# Patient Record
Sex: FEMALE | Race: BLACK OR AFRICAN AMERICAN | NOT HISPANIC OR LATINO | Employment: FULL TIME | URBAN - NONMETROPOLITAN AREA
[De-identification: names, ages, dates, MRNs, and addresses within clinical notes are randomized per-mention and may not be internally consistent; named-entity substitution may affect disease eponyms.]

---

## 2021-03-25 ENCOUNTER — OFFICE VISIT (OUTPATIENT)
Dept: CARDIOLOGY | Facility: CLINIC | Age: 56
End: 2021-03-25

## 2021-03-25 VITALS
BODY MASS INDEX: 31.07 KG/M2 | DIASTOLIC BLOOD PRESSURE: 78 MMHG | HEIGHT: 68 IN | HEART RATE: 78 BPM | WEIGHT: 205 LBS | SYSTOLIC BLOOD PRESSURE: 120 MMHG

## 2021-03-25 DIAGNOSIS — I10 ESSENTIAL HYPERTENSION: ICD-10-CM

## 2021-03-25 DIAGNOSIS — R06.09 DOE (DYSPNEA ON EXERTION): ICD-10-CM

## 2021-03-25 DIAGNOSIS — R00.2 PALPITATIONS: Primary | ICD-10-CM

## 2021-03-25 PROCEDURE — 99204 OFFICE O/P NEW MOD 45 MIN: CPT | Performed by: INTERNAL MEDICINE

## 2021-03-25 PROCEDURE — 93000 ELECTROCARDIOGRAM COMPLETE: CPT | Performed by: INTERNAL MEDICINE

## 2021-03-25 RX ORDER — CLONIDINE HYDROCHLORIDE 0.2 MG/1
TABLET ORAL
COMMUNITY
Start: 2021-02-15 | End: 2023-02-21

## 2021-03-25 RX ORDER — METOPROLOL SUCCINATE 50 MG/1
TABLET, EXTENDED RELEASE ORAL
COMMUNITY
Start: 2021-03-01 | End: 2022-11-28

## 2021-03-25 RX ORDER — LISINOPRIL 20 MG/1
20 TABLET ORAL 2 TIMES DAILY
COMMUNITY
Start: 2021-03-09 | End: 2022-11-28

## 2021-03-25 RX ORDER — HYDRALAZINE HYDROCHLORIDE 50 MG/1
50 TABLET, FILM COATED ORAL 3 TIMES DAILY
COMMUNITY
Start: 2021-02-23 | End: 2022-11-28

## 2021-03-25 RX ORDER — HYDROCHLOROTHIAZIDE 25 MG/1
25 TABLET ORAL DAILY
COMMUNITY
Start: 2021-03-16 | End: 2022-11-28

## 2021-03-25 RX ORDER — AMLODIPINE BESYLATE 5 MG/1
10 TABLET ORAL DAILY
COMMUNITY
Start: 2021-02-23 | End: 2022-11-28

## 2021-03-26 NOTE — PROGRESS NOTES
"    [facility] - CARDIOLOGY  New Patient Initial Outpatient Evaluation    Primary Care Physician: Domitila Sanabria APRN    Subjective     Chief Complaint:  Irregular heartbeat, high blood pressure    History of Present Illness    Ms. Banda is a 56-year-old female kindly referred to me for evaluation by Domitila Sanabria NP from [place], [place], for evaluation of palpitations. She also has a history of hypertension, mild valvular heart disease with records indicating mild aortic insufficiency and mild mitral regurgitation.    The patient reports palpitations that began approximately 8 to 9 months ago. She notes that she can occasionally feel her heartbeat and she can occasionally see her heartbeat in her chest. She describes the palpitations as episodes of pounding and racing that typically lasts a few minutes and resolve slowly. She does note occasional associated lightheadedness and shortness of breath and notes that she did not experience any palpitations during the time that she wore her last Holter monitor. She notes that these episodes occur once every 3 or 4 days and denies any associated syncope.    The patient also reports that her blood pressure has been elevated recently. She feels that her palpitations have worsened since she was placed on several blood pressure medications, but she cannot identify a specific one as the culprit. She notes an episode in which she was in [facility] on 01/22/2021 in which she felt \"weird\" so she presented at the emergency room at which time her blood pressure was determined to be 230/122 and her heart rate was 117 bpm. She notes that she has been to the emergency room 2 additional times for her elevated blood pressure. She reports that she was taking 4 blood pressure medications with her blood pressure still being elevated. She was prescribed HCTZ on  03/17/2021 after which her blood pressure decreased. She subsequently questions whether she still needs her hydralazine " since she started the HCTZ and notes that she is following with a nephrologist.    The patient also complains of exertional dyspnea that occurs after approximately 30 minutes of activity. She also admits to very little chest discomfort as well as fatigue. The fatigue is occasional and she describes it as a lack of energy that makes it difficult for her to get going.    The patient also complains of some indigestion over the last 3 weeks.    The patient reports that she has a significant family history of cardiac disease.      Review of Systems   Constitutional: Positive for malaise/fatigue.        Easy fatigability or decreasing exercise tolerance   Cardiovascular: Positive for dyspnea on exertion and palpitations. Negative for chest pain, claudication, leg swelling, near-syncope, orthopnea, paroxysmal nocturnal dyspnea and syncope.   Respiratory: Negative for shortness of breath.    Hematologic/Lymphatic: Does not bruise/bleed easily.        Otherwise complete ROS reviewed and negative except as mentioned in the HPI.      Past Medical History:   Past Medical History:   Diagnosis Date   • Arrhythmia    • Hypertension        Past Surgical History:History reviewed. No pertinent surgical history.    Family History: family history includes Heart attack in her father; Heart disease in her father.    Social History:  does not have a smoking history on file. She has never used smokeless tobacco. She reports that she does not drink alcohol and does not use drugs.    Medications:  Prior to Admission medications    Medication Sig Start Date End Date Taking? Authorizing Provider   amLODIPine (NORVASC) 5 MG tablet Take 10 mg by mouth Daily. 2/23/21  Yes Corrina Blake MD   cloNIDine (CATAPRES) 0.2 MG tablet TAKE 1 TABLET BY MOUTH EVERY 4 HOURS 2/15/21  Yes Corrina Blake MD   hydrALAZINE (APRESOLINE) 50 MG tablet Take 50 mg by mouth 3 (Three) Times a Day. 2/23/21  Yes Corrina Blake MD   hydroCHLOROthiazide  "(HYDRODIURIL) 25 MG tablet Take 25 mg by mouth Daily. 3/16/21  Yes Provider, MD Corrina   lisinopril (PRINIVIL,ZESTRIL) 20 MG tablet Take 20 mg by mouth 2 (Two) Times a Day. 3/9/21  Yes Provider, MD Corrina   metoprolol succinate XL (TOPROL-XL) 50 MG 24 hr tablet TAKE 1 & 1 2 (ONE & ONE HALF) TABLETS BY MOUTH TWICE DAILY 3/1/21  Yes ProviderCorrina MD   tapentadol (Nucynta) 100 MG tablet Nucynta 100 mg tablet   TAKE 1 TABLET BY MOUTH EVERY 8 HOURS AS NEEDED FOR PAIN*EXEMPT M25*   Yes Provider, MD Corrina     Allergies:  No Known Allergies    Objective     Vital Signs: /78   Pulse 78   Ht 172.7 cm (68\")   Wt 93 kg (205 lb)   BMI 31.17 kg/m²     Vitals and nursing note reviewed.   Constitutional:       General: Not in acute distress.     Appearance: Not in distress.   Neck:      Vascular: No JVD or JVR. JVD normal.   Pulmonary:      Effort: Pulmonary effort is normal.      Breath sounds: Normal breath sounds.   Cardiovascular:      Normal rate. Regular rhythm.      Murmurs: There is no murmur.      No gallop. No rub.   Pulses:     Intact distal pulses.   Edema:     Peripheral edema absent.   Skin:     General: Skin is warm and dry.   Neurological:      Mental Status: Alert, oriented to person, place, and time and oriented to person, place and time.         Results Reviewed:    -An echocardiogram report from 01/12/2021 was reviewed. This was performed at [facility] in [place], [place]. The report mentions and LV EF of 60-65% with mild concentric LVH, impaired LV relaxation, mildly dilated left atrium, mild mitral regurgitation, mild aortic insufficiency, and normal size and function of the right ventricle.    -A renal artery ultrasound duplex exam performed on 03/19/2021 was reviewed and reports no sonographic evidence of renal artery stenosis.      ECG 12 Lead    Date/Time: 3/25/2021 2:40 PM  Performed by: Galileo Wesley MD  Authorized by: Galileo Wesley MD   Comparison: compared " "with previous ECG from 1/13/2021  Similar to previous ECG  Rhythm: sinus rhythm  BPM: 78    Clinical impression: normal ECG              Assessment / Plan        Problem List Items Addressed This Visit        Cardiac and Vasculature    Palpitations - Primary    Relevant Orders    Holter Monitor - 72 Hour Up To 15 Days    Hypertension    Relevant Medications    metoprolol succinate XL (TOPROL-XL) 50 MG 24 hr tablet    lisinopril (PRINIVIL,ZESTRIL) 20 MG tablet    hydrALAZINE (APRESOLINE) 50 MG tablet    amLODIPine (NORVASC) 5 MG tablet    hydroCHLOROthiazide (HYDRODIURIL) 25 MG tablet    cloNIDine (CATAPRES) 0.2 MG tablet      Other Visit Diagnoses     HAMMOND (dyspnea on exertion)        Relevant Orders    Stress Test With Myocardial Perfusion (1 Day)        1. Palpitations: These seem relatively stable and her echocardiograms show a structurally normal heart, so they are very likely benign. However, she did not have symptoms while wearing the Holter last year.   -We will obtain the Holter monitor performed last year.    -We will place the patient in a 7-day Zio patch today in hopes of capturing an episode so as to provide symptom-rhythm correlation.    2. Exertional dyspnea: We will make sure this is not an ischemic equivalent by performing a Lexiscan nuclear perfusion stress test.     3. Essential hypertension: This is now well controlled after recent addition of a thiazide diuretic. Given this, it does not seem as though she has \"resistant hypertension\".  -She was asking to wean off hydralazine, but I asked that she discuss this with the prescribing physician.   -As blood pressure is well controlled currently, I would recommend continuing these therapies for now until she has discussed potential future changes with the physicians managing her blood pressure.     Follow-up to be determined on the basis of heart monitor and stress test results.     Scribed for Galileo Wesley MD by Leigh Hernandez.  03/25/21   19:32 " CDT    I Galileo Wesley MD have personally performed the services described in this document as scribed by the above individual, and it is both accurate and complete.   I have edited each component as needed.    Galileo Wesley MD  3/25/2021  23:11 CDT

## 2021-03-31 ENCOUNTER — TELEPHONE (OUTPATIENT)
Dept: CARDIOLOGY | Facility: CLINIC | Age: 56
End: 2021-03-31

## 2021-03-31 NOTE — TELEPHONE ENCOUNTER
Patient went back to work on 3/29 and had a couple of episodes of not feeling well. When she checked her blood pressure it was 180/101, then today it was 174/93. She was told her primary care provider would manage her blood pressure since it is well controlled, but she states her PCP will not write any more work excuses since she has seen you as her new cardiologist. She previously saw Dr Frank in [place], [place] who prescribed lisinopril and norvasc. Her kidney doctor prescribed hydralazine. She feels that her blood pressure is not well controlled even while on all of these medications. Please advise. Sharonda Douglas MA

## 2021-04-08 ENCOUNTER — HOSPITAL ENCOUNTER (OUTPATIENT)
Dept: CARDIOLOGY | Facility: HOSPITAL | Age: 56
Discharge: HOME | End: 2021-04-08

## 2021-04-08 VITALS
WEIGHT: 209 LBS | DIASTOLIC BLOOD PRESSURE: 83 MMHG | BODY MASS INDEX: 31.67 KG/M2 | HEIGHT: 68 IN | HEART RATE: 68 BPM | SYSTOLIC BLOOD PRESSURE: 160 MMHG

## 2021-04-08 DIAGNOSIS — R06.09 DOE (DYSPNEA ON EXERTION): ICD-10-CM

## 2021-04-08 LAB
BH CV REST NUCLEAR ISOTOPE DOSE: 10 MCI
BH CV STRESS BP STAGE 1: NORMAL
BH CV STRESS COMMENTS STAGE 1: NORMAL
BH CV STRESS DOSE REGADENOSON STAGE 1: 0.4
BH CV STRESS DURATION MIN STAGE 1: 0
BH CV STRESS DURATION SEC STAGE 1: 10
BH CV STRESS HR STAGE 1: 108
BH CV STRESS NUCLEAR ISOTOPE DOSE: 32 MCI
BH CV STRESS PROTOCOL 1: NORMAL
BH CV STRESS RECOVERY BP: NORMAL MMHG
BH CV STRESS RECOVERY HR: 99 BPM
BH CV STRESS STAGE 1: 1
MAXIMAL PREDICTED HEART RATE: 166 BPM
PERCENT MAX PREDICTED HR: 65.06 %
STRESS BASELINE BP: NORMAL MMHG
STRESS BASELINE HR: 68 BPM
STRESS PERCENT HR: 77 %
STRESS POST EXERCISE DUR SEC: 10 SEC
STRESS POST PEAK BP: NORMAL MMHG
STRESS POST PEAK HR: 108 BPM
STRESS TARGET HR: 141 BPM

## 2021-04-08 PROCEDURE — 0 TECHNETIUM SESTAMIBI: Performed by: INTERNAL MEDICINE

## 2021-04-08 PROCEDURE — 93017 CV STRESS TEST TRACING ONLY: CPT

## 2021-04-08 PROCEDURE — A9500 TC99M SESTAMIBI: HCPCS | Performed by: INTERNAL MEDICINE

## 2021-04-08 PROCEDURE — 93018 CV STRESS TEST I&R ONLY: CPT | Performed by: INTERNAL MEDICINE

## 2021-04-08 PROCEDURE — 25010000002 REGADENOSON 0.4 MG/5ML SOLUTION: Performed by: INTERNAL MEDICINE

## 2021-04-08 PROCEDURE — 78452 HT MUSCLE IMAGE SPECT MULT: CPT

## 2021-04-08 PROCEDURE — 78452 HT MUSCLE IMAGE SPECT MULT: CPT | Performed by: INTERNAL MEDICINE

## 2021-04-08 RX ADMIN — TECHNETIUM TC 99M SESTAMIBI 1 DOSE: 1 INJECTION INTRAVENOUS at 09:50

## 2021-04-08 RX ADMIN — REGADENOSON 0.4 MG: 0.08 INJECTION, SOLUTION INTRAVENOUS at 09:47

## 2021-04-08 RX ADMIN — TECHNETIUM TC 99M SESTAMIBI 1 DOSE: 1 INJECTION INTRAVENOUS at 08:33

## 2021-04-21 ENCOUNTER — TELEPHONE (OUTPATIENT)
Dept: CARDIOLOGY | Facility: CLINIC | Age: 56
End: 2021-04-21

## 2022-11-28 ENCOUNTER — OFFICE VISIT (OUTPATIENT)
Dept: OTOLARYNGOLOGY | Facility: CLINIC | Age: 57
End: 2022-11-28

## 2022-11-28 VITALS
WEIGHT: 223 LBS | HEART RATE: 81 BPM | DIASTOLIC BLOOD PRESSURE: 81 MMHG | TEMPERATURE: 97.3 F | SYSTOLIC BLOOD PRESSURE: 142 MMHG | BODY MASS INDEX: 33.8 KG/M2 | HEIGHT: 68 IN | RESPIRATION RATE: 16 BRPM

## 2022-11-28 DIAGNOSIS — R49.0 HOARSENESS: Primary | ICD-10-CM

## 2022-11-28 DIAGNOSIS — K21.9 LARYNGOPHARYNGEAL REFLUX (LPR): ICD-10-CM

## 2022-11-28 PROCEDURE — 99203 OFFICE O/P NEW LOW 30 MIN: CPT | Performed by: NURSE PRACTITIONER

## 2022-11-28 RX ORDER — ERGOCALCIFEROL (VITAMIN D2) 10 MCG
400 TABLET ORAL DAILY
COMMUNITY

## 2022-11-28 RX ORDER — BISOPROLOL FUMARATE 5 MG/1
TABLET, FILM COATED ORAL
COMMUNITY
Start: 2022-10-04

## 2022-11-28 RX ORDER — FELODIPINE 5 MG/1
TABLET, EXTENDED RELEASE ORAL
COMMUNITY
Start: 2022-11-08

## 2022-11-28 RX ORDER — MULTIPLE VITAMINS W/ MINERALS TAB 9MG-400MCG
1 TAB ORAL DAILY
COMMUNITY

## 2022-11-28 RX ORDER — PANTOPRAZOLE SODIUM 40 MG/1
40 TABLET, DELAYED RELEASE ORAL DAILY
Qty: 30 TABLET | Refills: 3 | Status: SHIPPED | OUTPATIENT
Start: 2022-11-28 | End: 2022-12-28

## 2022-11-28 RX ORDER — VALSARTAN AND HYDROCHLOROTHIAZIDE 320; 25 MG/1; MG/1
TABLET, FILM COATED ORAL
COMMUNITY
Start: 2022-10-04

## 2022-11-28 NOTE — PATIENT INSTRUCTIONS
Will obtain thyroid ultrasound per patient concern as well as abnormal TSH level  Reflux precautions  Take Protonix before breakfast    Gastroesophageal Reflux Disease (Laryngopharyngeal Reflux), Adult  Gastroesophageal reflux disease (GERD) and/or Laryngopharyngeal Reflux, (LPR) happens when acid from your stomach flows up into the esophagus and/or throat and voicebox or larynx. When acid comes in contact with the these organs, the acid can cause soreness (inflammation). Over time, GERD may create small holes (ulcers) in the lining of the esophagus and may lead to the development of hoarseness, difficulty swallowing,   feeling of something stuck in the throat, increased mucous or drainage and even predispose to the development of malignancies, (cancer).    CAUSES   Increased body weight. This puts pressure on the stomach, making acid rise from the stomach into the esophagus.  Smoking. This increases acid production in the stomach.  Drinking alcohol. This causes decreased pressure in the lower esophageal sphincter (valve or ring of muscle between the esophagus and stomach), allowing acid from the stomach into the esophagus.  Late evening meals and a full stomach. This increases pressure and acid production in the stomach.  A malformed lower esophageal sphincter  Diet which can include avoidance of gluten and dairy products  Age  SYMPTOMS   Burning pain in the lower part of the mid-chest behind the breastbone and in the mid-stomach area. This may occur twice a week or more often.  Trouble swallowing.  Sore throat.  Dry cough.  Asthma-like symptoms including chest tightness, shortness of breath, or wheezing.  Globus sensation-something stuck in the throat/fullness  Hoarseness  DIAGNOSIS   Your caregiver may be able to diagnose GERD based on your symptoms. In some cases, X-rays and other tests may be done to check for complications or to check the condition of your stomach and esophagus.  You may need to see another  doctor.  TREATMENT   Over-the-counter or prescription medicines to help decrease acid production.   Dietary and behavioral modifications or changes may be also recommended.  HOME CARE INSTRUCTIONS   Change the factors that you can control. Ask your caregiver for guidance concerning weight loss, quitting smoking, and alcohol consumption.  Avoid foods and drinks that make your symptoms worse, and MAY include such as:  Caffeine or alcoholic drinks.  Chocolate.  Gluten containing foods  Dairy  Peppermint or mint flavorings.  Garlic and onions.  Spicy foods.  Citrus fruits, such as oranges, cedrick, or limes.  Tomato-based foods such as sauce, chili, salsa, and pizza.  Fried and fatty foods.  Avoid lying down for the 3 hours prior to your bedtime or prior to taking a nap.  Eat small, frequent meals instead of large meals.  Wear loose-fitting clothing. Do not wear anything tight around your waist that causes pressure on your stomach.  Raise the head of your bed 6 to 8 inches with wood blocks to help you sleep. Extra pillows will not help.  Only take over-the-counter or prescription medicines for pain, discomfort, or fever as directed by your caregiver.  Do not take aspirin, ibuprofen, or other nonsteroidal anti-inflammatory drugs if possible (NSAIDs).  SEEK IMMEDIATE MEDICAL CARE IF:   You have pain in your arms, neck, jaw, teeth, or back.  Your pain increases or changes in intensity or duration.  You develop nausea, vomiting, or sweating (diaphoresis).  You develop shortness of breath, or you faint.  Your vomit is green, yellow, black, or looks like coffee grounds or blood.  Your stool is red, bloody, or black.  These symptoms could be signs of other problems, such as heart disease, gastric bleeding, or esophageal bleeding.  MAKE SURE YOU:   Understand these instructions.  Will watch your condition.  Will get help right away if you are not doing well or get worse.     This information is not intended to replace advice  given to you by your physician. Make sure you discuss any questions you have with your health care provider.     Modified by Pedro Sanabria MD, FACS 9/8/2016.  Document Released: 09/27/2006 Document Revised: 01/08/2016 Document Reviewed: 04/13/2016  Elsevier Interactive Patient Education ©2016 [employer]

## 2022-11-28 NOTE — PROGRESS NOTES
YOB: 1965  Location: [facility]  Location Address: [address] [place] [address], [facility] [address], [address] [place] [address]  Location Phone: [phone]    Chief Complaint   Patient presents with   • Hoarse       History of Present Illness  Rabia Banda is a 57 y.o. female.  Rabia Banda is here for evaluation of ENT complaints. The patient has had problems with hoarseness. The symptoms are not localized to a particular location. The patient has had intermittent symptoms. The symptoms have been present for the last 2 years. There have been no identified factors that aggravate the symptoms. There have been no factors that have improved the symptoms.    She is not currently taking reflux medication. She declines fatigue, dysphagia, globus sensation, and sore throat.    TSH (10/03/2022 01:00 EDT)  T4, free (10/03/2022 01:00 EDT)      Past Medical History:   Diagnosis Date   • Arrhythmia    • Hypertension        History reviewed. No pertinent surgical history.    Outpatient Medications Marked as Taking for the 22 encounter (Office Visit) with Puma Roa APRN   Medication Sig Dispense Refill   • bisoprolol (ZEBeta) 5 MG tablet      • Cyanocobalamin (VITAMIN B 12 PO) Take  by mouth.     • felodipine (PLENDIL) 5 MG 24 hr tablet      • multivitamin with minerals tablet tablet Take 1 tablet by mouth Daily.     • tapentadol (NUCYNTA) 100 MG tablet Nucynta 100 mg tablet   TAKE 1 TABLET BY MOUTH EVERY 8 HOURS AS NEEDED FOR PAIN*EXEMPT M25*     • valsartan-hydrochlorothiazide (DIOVAN-HCT) 320-25 MG per tablet      • Vitamin D, Cholecalciferol, (CHOLECALCIFEROL) 10 MCG (400 UNIT) tablet Take 400 Units by mouth Daily.     • [DISCONTINUED] amLODIPine (NORVASC) 5 MG tablet Take 10 mg by mouth Daily.         Patient has no known allergies.    Family History   Problem Relation Age of Onset   • Heart disease Father    • Heart attack Father        Social History     Socioeconomic History   • Marital status: Single   Tobacco  Use   • Smoking status: Never   • Smokeless tobacco: Never   Vaping Use   • Vaping Use: Never used   Substance and Sexual Activity   • Alcohol use: Never   • Drug use: Never   • Sexual activity: Defer       Review of Systems   Constitutional: Negative.    HENT: Positive for voice change. Negative for sore throat and trouble swallowing.    Respiratory: Negative.    Cardiovascular: Negative.    Genitourinary: Negative.    Neurological: Negative.        Vitals:    11/28/22 1028   BP: 142/81   Pulse: 81   Resp: 16   Temp: 97.3 °F (36.3 °C)       Body mass index is 33.91 kg/m².    Objective     Physical Exam  Vitals reviewed.   Constitutional:       Appearance: Normal appearance. She is obese.   HENT:      Head: Normocephalic and atraumatic.      Right Ear: Hearing, tympanic membrane, ear canal and external ear normal.      Left Ear: Hearing, tympanic membrane, ear canal and external ear normal.      Nose: Nose normal.      Mouth/Throat:      Lips: Pink.      Mouth: Mucous membranes are moist.      Pharynx: Oropharynx is clear. Uvula midline.   Neck:      Thyroid: No thyroid mass, thyromegaly or thyroid tenderness.   Musculoskeletal:      Cervical back: Full passive range of motion without pain.   Neurological:      Mental Status: She is alert.   Psychiatric:         Behavior: Behavior is cooperative.         Assessment & Plan   Diagnoses and all orders for this visit:    1. Hoarseness (Primary)  -     US Thyroid; Future    2. Laryngopharyngeal reflux (LPR)    Other orders  -     pantoprazole (PROTONIX) 40 MG EC tablet; Take 1 tablet by mouth Daily for 30 days. Take 40 mg by mouth 30 minute-1 hour before the last meal before going to bed  Dispense: 30 tablet; Refill: 3      * Surgery not found *  Orders Placed This Encounter   Procedures   • US Thyroid     Standing Status:   Future     Standing Expiration Date:   11/28/2023     Order Specific Question:   Reason for Exam:     Answer:   Thyroid disease     Will obtain  thyroid ultrasound per patient concern as well as abnormal TSH level  Reflux precautions  Take Protonix before breakfast  Call with any new/worsening problems or concerns    Return in about 6 weeks (around 1/9/2023) for Recheck.       Patient Instructions   Will obtain thyroid ultrasound per patient concern as well as abnormal TSH level  Reflux precautions  Take Protonix before breakfast    Gastroesophageal Reflux Disease (Laryngopharyngeal Reflux), Adult  Gastroesophageal reflux disease (GERD) and/or Laryngopharyngeal Reflux, (LPR) happens when acid from your stomach flows up into the esophagus and/or throat and voicebox or larynx. When acid comes in contact with the these organs, the acid can cause soreness (inflammation). Over time, GERD may create small holes (ulcers) in the lining of the esophagus and may lead to the development of hoarseness, difficulty swallowing,   feeling of something stuck in the throat, increased mucous or drainage and even predispose to the development of malignancies, (cancer).    CAUSES   · Increased body weight. This puts pressure on the stomach, making acid rise from the stomach into the esophagus.  · Smoking. This increases acid production in the stomach.  · Drinking alcohol. This causes decreased pressure in the lower esophageal sphincter (valve or ring of muscle between the esophagus and stomach), allowing acid from the stomach into the esophagus.  · Late evening meals and a full stomach. This increases pressure and acid production in the stomach.  · A malformed lower esophageal sphincter  · Diet which can include avoidance of gluten and dairy products  · Age  SYMPTOMS   · Burning pain in the lower part of the mid-chest behind the breastbone and in the mid-stomach area. This may occur twice a week or more often.  · Trouble swallowing.  · Sore throat.  · Dry cough.  · Asthma-like symptoms including chest tightness, shortness of breath, or wheezing.  · Globus sensation-something  stuck in the throat/fullness  · Hoarseness  DIAGNOSIS   Your caregiver may be able to diagnose GERD based on your symptoms. In some cases, X-rays and other tests may be done to check for complications or to check the condition of your stomach and esophagus.  You may need to see another doctor.  TREATMENT   Over-the-counter or prescription medicines to help decrease acid production.   Dietary and behavioral modifications or changes may be also recommended.  HOME CARE INSTRUCTIONS   · Change the factors that you can control. Ask your caregiver for guidance concerning weight loss, quitting smoking, and alcohol consumption.  · Avoid foods and drinks that make your symptoms worse, and MAY include such as:  ¨ Caffeine or alcoholic drinks.  ¨ Chocolate.  ¨ Gluten containing foods  ¨ Dairy  ¨ Peppermint or mint flavorings.  ¨ Garlic and onions.  ¨ Spicy foods.  ¨ Citrus fruits, such as oranges, cedrick, or limes.  ¨ Tomato-based foods such as sauce, chili, salsa, and pizza.  ¨ Fried and fatty foods.  · Avoid lying down for the 3 hours prior to your bedtime or prior to taking a nap.  · Eat small, frequent meals instead of large meals.  · Wear loose-fitting clothing. Do not wear anything tight around your waist that causes pressure on your stomach.  · Raise the head of your bed 6 to 8 inches with wood blocks to help you sleep. Extra pillows will not help.  · Only take over-the-counter or prescription medicines for pain, discomfort, or fever as directed by your caregiver.  · Do not take aspirin, ibuprofen, or other nonsteroidal anti-inflammatory drugs if possible (NSAIDs).  SEEK IMMEDIATE MEDICAL CARE IF:   · You have pain in your arms, neck, jaw, teeth, or back.  · Your pain increases or changes in intensity or duration.  · You develop nausea, vomiting, or sweating (diaphoresis).  · You develop shortness of breath, or you faint.  · Your vomit is green, yellow, black, or looks like coffee grounds or blood.  · Your stool is red,  bloody, or black.  These symptoms could be signs of other problems, such as heart disease, gastric bleeding, or esophageal bleeding.  MAKE SURE YOU:   · Understand these instructions.  · Will watch your condition.  · Will get help right away if you are not doing well or get worse.     This information is not intended to replace advice given to you by your physician. Make sure you discuss any questions you have with your health care provider.     Modified by Pedro Sanabria MD, FACS 9/8/2016.  Document Released: 09/27/2006 Document Revised: 01/08/2016 Document Reviewed: 04/13/2016  [employer] Interactive Patient Education ©2016 [employer]

## 2023-02-20 NOTE — PROGRESS NOTES
"YOB: 1965  Location: [facility]  Location Address: [facility], [facility] [address], [address] [place] [address]  Location Phone: [phone]    Chief Complaint   Patient presents with   • Hoarse       History of Present Illness  Rabia Banda is a 58 y.o. female.  Rabia Banda is here for follow up of ENT complaints. The patient has had problems with laryngopharyngeal reflux and a possible thyroid nodule.  Patient have reported intermittent symptoms present for the previous couple of years and was placed on a PPI once a day and has noticed some significant improvement but still has occasional indigestion and dyspepsia and her voice occasionally also still bothers her.  Does not hurt but she notices that she is hoarse.  She particularly notices this after she does not \"eat well\".  Patient denies pain, dysphagia, odynophagia or other difficulties.    She has a history of obstructive sleep apnea and utilizes CPAP mask for approximately 1 year.  She says she utilizes it more than 4 hours per night and feels much better than prior to treatment.       Past Medical History:   Diagnosis Date   • Arrhythmia    • GERD (gastroesophageal reflux disease) Last  Year   • Hypertension    • Sleep apnea        Past Surgical History:   Procedure Laterality Date   • EYE SURGERY  2022       Outpatient Medications Marked as Taking for the 23 encounter (Office Visit) with Pedro Sanabria MD   Medication Sig Dispense Refill   • bisoprolol (ZEBeta) 5 MG tablet      • Cyanocobalamin (VITAMIN B 12 PO) Take  by mouth.     • felodipine (PLENDIL) 5 MG 24 hr tablet      • multivitamin with minerals tablet tablet Take 1 tablet by mouth Daily.     • tapentadol (NUCYNTA) 100 MG tablet Nucynta 100 mg tablet   TAKE 1 TABLET BY MOUTH EVERY 8 HOURS AS NEEDED FOR PAIN*EXEMPT M25*     • valsartan-hydrochlorothiazide (DIOVAN-HCT) 320-25 MG per tablet      • Vitamin D, Cholecalciferol, (CHOLECALCIFEROL) 10 MCG (400 UNIT) " tablet Take 400 Units by mouth Daily.     • [DISCONTINUED] pantoprazole (PROTONIX) 40 MG EC tablet Take 40 mg by mouth Daily.         Patient has no known allergies.    Family History   Problem Relation Age of Onset   • Cancer Mother    • Hypertension Mother    • Heart disease Father    • Heart attack Father    • Heart failure Father    • Hypertension Father        Social History     Socioeconomic History   • Marital status: Single   Tobacco Use   • Smoking status: Never   • Smokeless tobacco: Never   Vaping Use   • Vaping Use: Never used   Substance and Sexual Activity   • Alcohol use: Not Currently   • Drug use: Never   • Sexual activity: Defer       Review of Systems    Vitals:    02/21/23 1324   BP: 163/76   Pulse: 78   Resp: 16   Temp: 97.8 °F (36.6 °C)       Body mass index is 33.75 kg/m².    Objective     Physical Exam  CONSTITUTIONAL: well nourished, well-developed, alert, oriented, in no acute distress     COMMUNICATION AND VOICE: able to communicate normally, normal voice quality    HEAD: normocephalic, no lesions, atraumatic, no tenderness, no masses     FACE: appearance normal, no lesions, no tenderness, no deformities, facial motion symmetric    EYES: ocular motility normal, eyelids normal, orbits normal, no proptosis, conjunctiva normal , pupils equal, round     EARS:  Hearing: hearing to conversational voice intact bilaterally   External Ears: normal bilaterally, no lesions    NOSE:  External Nose: external nasal structure normal, no tenderness on palpation, no nasal discharge, no lesions, no evidence of trauma, nostrils patent     ORAL:  Lips: upper and lower lips without lesion     LARYNX:  See endoscopy    NECK:  Inspection and Palpation: neck appearance normal, no masses or tenderness    CHEST/RESPIRATORY: normal respiratory effort     CARDIOVASCULAR: no cyanosis or edema     NEUROLOGICAL/PSYCHIATRIC: oriented to time, place and person, mood normal, affect appropriate, CN II-XII intact  grossly    Assessment & Plan   Diagnoses and all orders for this visit:    1. Laryngopharyngeal reflux (Primary)  -     CT Soft Tissue Neck With & Without Contrast    2. Thyroid nodule  Comments:  Subcentimeter right inferior thyroid nodule, TI-RADS pending  Orders:  -     CT Soft Tissue Neck With & Without Contrast    3. Obstructive sleep apnea of adult    Other orders  -     pantoprazole (Protonix) 20 MG EC tablet; Take 1 tablet by mouth 2 (Two) Times a Day Before Meals.  Dispense: 60 tablet; Refill: 2      * Surgery not found *  Orders Placed This Encounter   Procedures   • CT Soft Tissue Neck With & Without Contrast     Return in about 3 months (around 5/21/2023).       Patient Instructions   Increase pantoprazole to twice daily before every meal  Lifestyle measures discussed with patient  CT scan of the neck with contrast on follow-up    Ultrasound in 2 years to reevaluate thyroid nodule    Gastroesophageal Reflux Disease (Laryngopharyngeal Reflux), Adult  Gastroesophageal reflux disease (GERD) and/or Laryngopharyngeal Reflux, (LPR) happens when acid from your stomach flows up into the esophagus and/or throat and voicebox or larynx. When acid comes in contact with the these organs, the acid can cause soreness (inflammation). Over time, GERD may create small holes (ulcers) in the lining of the esophagus and may lead to the development of hoarseness, difficulty swallowing,   feeling of something stuck in the throat, increased mucous or drainage and even predispose to the development of malignancies, (cancer).    CAUSES   · Increased body weight. This puts pressure on the stomach, making acid rise from the stomach into the esophagus.  · Smoking. This increases acid production in the stomach.  · Drinking alcohol. This causes decreased pressure in the lower esophageal sphincter (valve or ring of muscle between the esophagus and stomach), allowing acid from the stomach into the esophagus.  · Late evening meals and a  full stomach. This increases pressure and acid production in the stomach.  · A malformed lower esophageal sphincter  · Diet which can include avoidance of gluten and dairy products  · Age  SYMPTOMS   · Burning pain in the lower part of the mid-chest behind the breastbone and in the mid-stomach area. This may occur twice a week or more often.  · Trouble swallowing.  · Sore throat.  · Dry cough.  · Asthma-like symptoms including chest tightness, shortness of breath, or wheezing.  · Globus sensation-something stuck in the throat/fullness  · Hoarseness  DIAGNOSIS   Your caregiver may be able to diagnose GERD based on your symptoms. In some cases, X-rays and other tests may be done to check for complications or to check the condition of your stomach and esophagus.  You may need to see another doctor.  TREATMENT   Over-the-counter or prescription medicines to help decrease acid production.   Dietary and behavioral modifications or changes may be also recommended.  HOME CARE INSTRUCTIONS   · Change the factors that you can control. Ask your caregiver for guidance concerning weight loss, quitting smoking, and alcohol consumption.  · Avoid foods and drinks that make your symptoms worse, and MAY include such as:  ¨ Caffeine or alcoholic drinks.  ¨ Chocolate.  ¨ Gluten containing foods  ¨ Dairy  ¨ Peppermint or mint flavorings.  ¨ Garlic and onions.  ¨ Spicy foods.  ¨ Citrus fruits, such as oranges, cedrick, or limes.  ¨ Tomato-based foods such as sauce, chili, salsa, and pizza.  ¨ Fried and fatty foods.  · Avoid lying down for the 3 hours prior to your bedtime or prior to taking a nap.  · Eat small, frequent meals instead of large meals.  · Wear loose-fitting clothing. Do not wear anything tight around your waist that causes pressure on your stomach.  · Raise the head of your bed 6 to 8 inches with wood blocks to help you sleep. Extra pillows will not help.  · Only take over-the-counter or prescription medicines for pain,  discomfort, or fever as directed by your caregiver.  · Do not take aspirin, ibuprofen, or other nonsteroidal anti-inflammatory drugs if possible (NSAIDs).  SEEK IMMEDIATE MEDICAL CARE IF:   · You have pain in your arms, neck, jaw, teeth, or back.  · Your pain increases or changes in intensity or duration.  · You develop nausea, vomiting, or sweating (diaphoresis).  · You develop shortness of breath, or you faint.  · Your vomit is green, yellow, black, or looks like coffee grounds or blood.  · Your stool is red, bloody, or black.  These symptoms could be signs of other problems, such as heart disease, gastric bleeding, or esophageal bleeding.  MAKE SURE YOU:   · Understand these instructions.  · Will watch your condition.  · Will get help right away if you are not doing well or get worse.     This information is not intended to replace advice given to you by your physician. Make sure you discuss any questions you have with your health care provider.     Modified by Pedro Sanabria MD, FACS 9/8/2016.  Document Released: 09/27/2006 Document Revised: 01/08/2016 Document Reviewed: 04/13/2016  [employer] Interactive Patient Education ©2016 [employer]

## 2023-02-21 ENCOUNTER — OFFICE VISIT (OUTPATIENT)
Dept: OTOLARYNGOLOGY | Facility: CLINIC | Age: 58
End: 2023-02-21
Payer: COMMERCIAL

## 2023-02-21 VITALS
HEIGHT: 68 IN | WEIGHT: 222 LBS | DIASTOLIC BLOOD PRESSURE: 76 MMHG | BODY MASS INDEX: 33.65 KG/M2 | HEART RATE: 78 BPM | SYSTOLIC BLOOD PRESSURE: 163 MMHG | TEMPERATURE: 97.8 F | RESPIRATION RATE: 16 BRPM

## 2023-02-21 DIAGNOSIS — E04.1 THYROID NODULE: ICD-10-CM

## 2023-02-21 DIAGNOSIS — G47.33 OBSTRUCTIVE SLEEP APNEA OF ADULT: ICD-10-CM

## 2023-02-21 DIAGNOSIS — K21.9 LARYNGOPHARYNGEAL REFLUX: Primary | ICD-10-CM

## 2023-02-21 PROCEDURE — 99213 OFFICE O/P EST LOW 20 MIN: CPT | Performed by: OTOLARYNGOLOGY

## 2023-02-21 PROCEDURE — 31575 DIAGNOSTIC LARYNGOSCOPY: CPT | Performed by: OTOLARYNGOLOGY

## 2023-02-21 RX ORDER — PANTOPRAZOLE SODIUM 20 MG/1
20 TABLET, DELAYED RELEASE ORAL
Qty: 60 TABLET | Refills: 2 | Status: SHIPPED | OUTPATIENT
Start: 2023-02-21

## 2023-02-21 RX ORDER — PANTOPRAZOLE SODIUM 40 MG/1
40 TABLET, DELAYED RELEASE ORAL DAILY
COMMUNITY
Start: 2022-11-28 | End: 2023-02-21 | Stop reason: ALTCHOICE

## 2023-02-21 NOTE — PROGRESS NOTES
OPERATIVE NOTE:  Rabia Banda    DATE OF PROCEDURE: 2/21/2023    PROCEDURE:   Flexible Fiberoptic Laryngoscopy    ANESTHESIA:  None    REASON FOR PROCEDURE:  Procedure was recommended for persistant hoarseness, suspicious clinical behavior and globus sensation  Risks, benefits and alternatives were discussed.      DETAILS of OPERATION:  The patient was seated in the exam chair.  A flexible fiberoptic laryngoscopy was performed through the oral cavity.  The scope was introduced into the oral cavity and directed to the level of the glottis, examining the structures of the oropharynx, base of tongue, vallecula, supraglottic larynx, glottic larynx, and hypopharynx.      FINDINGS:  Mucosal surfaces:   The mucosal surfaces demonstrated normal mucosa surfaces with moderate inflammation    Base of tongue:  The base of tongue was found to have no mass or lesion.    Epiglottis:  The epiglottis was found to have no mass or lesion.    Aryepiglottic fold:  The AE folds were found to have no mass or lesion.    False Vocal Fold:  The false cords were found to have no mass or lesion.    True Vocal Cord:  The true vocal cords were found to have no mass or lesion. Both true vocal cords adduct and abduct normally    Arytenoid:   The arytenoids were found to have moderate inter-arytenoid edema with moderate erythema erythema particularly posteriorly with mild asymmetry of the arytenoids right greater than left.    Hypopharynx:  The hypopharynx was found to have no mass or lesion.    The patient tolerated procedure well.

## 2023-02-21 NOTE — PATIENT INSTRUCTIONS
Increase pantoprazole to twice daily before every meal  Lifestyle measures discussed with patient  CT scan of the neck with contrast on follow-up    We will obtain copy of sleep study prior to follow-up    Ultrasound in 2 years to reevaluate thyroid nodule    Gastroesophageal Reflux Disease (Laryngopharyngeal Reflux), Adult  Gastroesophageal reflux disease (GERD) and/or Laryngopharyngeal Reflux, (LPR) happens when acid from your stomach flows up into the esophagus and/or throat and voicebox or larynx. When acid comes in contact with the these organs, the acid can cause soreness (inflammation). Over time, GERD may create small holes (ulcers) in the lining of the esophagus and may lead to the development of hoarseness, difficulty swallowing,   feeling of something stuck in the throat, increased mucous or drainage and even predispose to the development of malignancies, (cancer).    CAUSES   Increased body weight. This puts pressure on the stomach, making acid rise from the stomach into the esophagus.  Smoking. This increases acid production in the stomach.  Drinking alcohol. This causes decreased pressure in the lower esophageal sphincter (valve or ring of muscle between the esophagus and stomach), allowing acid from the stomach into the esophagus.  Late evening meals and a full stomach. This increases pressure and acid production in the stomach.  A malformed lower esophageal sphincter  Diet which can include avoidance of gluten and dairy products  Age  SYMPTOMS   Burning pain in the lower part of the mid-chest behind the breastbone and in the mid-stomach area. This may occur twice a week or more often.  Trouble swallowing.  Sore throat.  Dry cough.  Asthma-like symptoms including chest tightness, shortness of breath, or wheezing.  Globus sensation-something stuck in the throat/fullness  Hoarseness  DIAGNOSIS   Your caregiver may be able to diagnose GERD based on your symptoms. In some cases, X-rays and other tests may  be done to check for complications or to check the condition of your stomach and esophagus.  You may need to see another doctor.  TREATMENT   Over-the-counter or prescription medicines to help decrease acid production.   Dietary and behavioral modifications or changes may be also recommended.  HOME CARE INSTRUCTIONS   Change the factors that you can control. Ask your caregiver for guidance concerning weight loss, quitting smoking, and alcohol consumption.  Avoid foods and drinks that make your symptoms worse, and MAY include such as:  Caffeine or alcoholic drinks.  Chocolate.  Gluten containing foods  Dairy  Peppermint or mint flavorings.  Garlic and onions.  Spicy foods.  Citrus fruits, such as oranges, cedrick, or limes.  Tomato-based foods such as sauce, chili, salsa, and pizza.  Fried and fatty foods.  Avoid lying down for the 3 hours prior to your bedtime or prior to taking a nap.  Eat small, frequent meals instead of large meals.  Wear loose-fitting clothing. Do not wear anything tight around your waist that causes pressure on your stomach.  Raise the head of your bed 6 to 8 inches with wood blocks to help you sleep. Extra pillows will not help.  Only take over-the-counter or prescription medicines for pain, discomfort, or fever as directed by your caregiver.  Do not take aspirin, ibuprofen, or other nonsteroidal anti-inflammatory drugs if possible (NSAIDs).  SEEK IMMEDIATE MEDICAL CARE IF:   You have pain in your arms, neck, jaw, teeth, or back.  Your pain increases or changes in intensity or duration.  You develop nausea, vomiting, or sweating (diaphoresis).  You develop shortness of breath, or you faint.  Your vomit is green, yellow, black, or looks like coffee grounds or blood.  Your stool is red, bloody, or black.  These symptoms could be signs of other problems, such as heart disease, gastric bleeding, or esophageal bleeding.  MAKE SURE YOU:   Understand these instructions.  Will watch your  condition.  Will get help right away if you are not doing well or get worse.     This information is not intended to replace advice given to you by your physician. Make sure you discuss any questions you have with your health care provider.     Modified by Pedro Sanabria MD, FACS 9/8/2016.  Document Released: 09/27/2006 Document Revised: 01/08/2016 Document Reviewed: 04/13/2016  [employer] Interactive Patient Education ©2016 [employer]